# Patient Record
Sex: FEMALE | Employment: UNEMPLOYED | URBAN - METROPOLITAN AREA
[De-identification: names, ages, dates, MRNs, and addresses within clinical notes are randomized per-mention and may not be internally consistent; named-entity substitution may affect disease eponyms.]

---

## 2017-01-15 ENCOUNTER — HOSPITAL ENCOUNTER (EMERGENCY)
Age: 27
Discharge: HOME | End: 2017-01-15
Attending: EMERGENCY MEDICINE
Payer: COMMERCIAL

## 2017-01-15 VITALS
TEMPERATURE: 98 F | HEIGHT: 65 IN | RESPIRATION RATE: 16 BRPM | OXYGEN SATURATION: 99 % | HEART RATE: 58 BPM | WEIGHT: 140 LBS | SYSTOLIC BLOOD PRESSURE: 119 MMHG | DIASTOLIC BLOOD PRESSURE: 77 MMHG | BODY MASS INDEX: 23.32 KG/M2

## 2017-01-15 DIAGNOSIS — H10.31 ACUTE BACTERIAL CONJUNCTIVITIS OF RIGHT EYE: Primary | ICD-10-CM

## 2017-01-15 PROCEDURE — 99281 EMR DPT VST MAYX REQ PHY/QHP: CPT

## 2017-01-15 RX ORDER — OFLOXACIN 3 MG/ML
SOLUTION/ DROPS OPHTHALMIC
Qty: 5 ML | Refills: 0 | Status: SHIPPED | OUTPATIENT
Start: 2017-01-15

## 2017-01-15 RX ORDER — PROPARACAINE HYDROCHLORIDE 5 MG/ML
1 SOLUTION/ DROPS OPHTHALMIC
Status: DISCONTINUED | OUTPATIENT
Start: 2017-01-15 | End: 2017-01-15 | Stop reason: HOSPADM

## 2017-01-15 RX ORDER — IBUPROFEN 800 MG/1
800 TABLET ORAL
Qty: 20 TAB | Refills: 0 | Status: SHIPPED | OUTPATIENT
Start: 2017-01-15 | End: 2017-01-22

## 2017-01-15 NOTE — ED NOTES
Both written and verbal discharge instructions given to pt with verbalized understanding of home care and follow up. Prescriptions given.

## 2017-01-15 NOTE — ED TRIAGE NOTES
Triage: pt complains of pain to right eye after inserting contact lens today. Pt states that it feels like she scratched it.

## 2017-01-15 NOTE — ED PROVIDER NOTES
Patient is a 32 y.o. female presenting with eye pain. The history is provided by the patient. Eye Pain    This is a new problem. The current episode started 6 to 12 hours ago. The problem occurs constantly. The problem has not changed since onset. The right eye is affected. The injury mechanism was contact lenses (Old contact lenses). The pain is at a severity of 9/10. History of trauma: Only putting in contacts, worried about \"scratch to eye\" There is no known exposure to pink eye. She wears contacts. Associated symptoms include discharge, eye redness, itching and pain. Pertinent negatives include no numbness, no blurred vision, no decreased vision, no double vision, no foreign body sensation, no photophobia, no nausea, no vomiting, no tingling, no weakness, no fever, no blindness, no head injury and no dizziness. She has tried nothing for the symptoms. Past Medical History:   Diagnosis Date    Asthma      Age 7-8 then resolved    Sepsis(995.91) 4/2012     Post dental extraction-developed a splenic abscess       Past Surgical History:   Procedure Laterality Date    Hx splenectomy  4/2012         Family History:   Problem Relation Age of Onset    Diabetes Father        Social History     Social History    Marital status: SINGLE     Spouse name: N/A    Number of children: N/A    Years of education: N/A     Occupational History          Social History Main Topics    Smoking status: Current Some Day Smoker     Packs/day: 0.25     Years: 3.00     Types: Cigarettes    Smokeless tobacco: Never Used      Comment: 2 cigs/ day    Alcohol use 1.0 oz/week     2 Shots of liquor per week      Comment: occasionally.  Drug use: Yes     Special: Prescription, OTC    Sexual activity: Yes     Partners: Female     Birth control/ protection: None     Other Topics Concern    Not on file     Social History Narrative    Unemployed currently, going to school for GED currently.     Wants to go to school for business marketing. ALLERGIES: Review of patient's allergies indicates no known allergies. Review of Systems   Constitutional: Negative for chills and fever. HENT: Negative for ear pain, rhinorrhea and sore throat. Eyes: Positive for pain, discharge, redness and itching. Negative for blindness, blurred vision, double vision, photophobia and visual disturbance. Respiratory: Negative for cough and shortness of breath. Cardiovascular: Negative for chest pain. Gastrointestinal: Negative for abdominal pain, constipation, diarrhea, nausea and vomiting. Genitourinary: Negative for dysuria. Skin: Positive for itching. Neurological: Negative for dizziness, tingling, weakness, light-headedness, numbness and headaches. Psychiatric/Behavioral: Negative. Vitals:    01/15/17 1617   BP: 119/77   Pulse: (!) 58   Resp: 16   Temp: 98 °F (36.7 °C)   SpO2: 99%   Weight: 63.5 kg (140 lb)   Height: 5' 5\" (1.651 m)            Physical Exam   Constitutional: She is oriented to person, place, and time. She appears well-developed and well-nourished. HENT:   Head: Normocephalic and atraumatic. Right Ear: Tympanic membrane, external ear and ear canal normal.   Left Ear: Tympanic membrane, external ear and ear canal normal.   Nose: Nose normal.   Mouth/Throat: Oropharynx is clear and moist and mucous membranes are normal.   Eyes: EOM and lids are normal. Pupils are equal, round, and reactive to light. Lids are everted and swept, no foreign bodies found. Right eye exhibits no chemosis, no discharge, no exudate and no hordeolum. No foreign body present in the right eye. Left eye exhibits no chemosis, no discharge, no exudate and no hordeolum. No foreign body present in the left eye. Right conjunctiva is injected. Right conjunctiva has no hemorrhage. Left conjunctiva is not injected. Left conjunctiva has no hemorrhage. No scleral icterus.  Right eye exhibits normal extraocular motion and no nystagmus. Left eye exhibits normal extraocular motion and no nystagmus. Left eye:  EOMI. Non-icteric sclera. ERYTHEMATOUS conjunctiva. No foreign bodies noted. Noted visual acuity in triage. There are no signs of cellulitis nor periorbital cellulitis. Right eye: EOMI. Non-icteric sclera. ERYTHEMATOUS conjunctiva. No foreign bodies noted. No fluorescein uptake noted. Noted visual acuity in triage. There are no signs of cellulitis nor periorbital cellulitis. Neck: Normal range of motion. Cardiovascular: Normal rate, regular rhythm and normal heart sounds. Pulmonary/Chest: Effort normal and breath sounds normal.   Abdominal: Soft. Bowel sounds are normal. There is no tenderness. Musculoskeletal: Normal range of motion. Neurological: She is alert and oriented to person, place, and time. Skin: Skin is warm and dry. Psychiatric: She has a normal mood and affect. Her behavior is normal. Judgment and thought content normal.   Nursing note and vitals reviewed. MDM  Number of Diagnoses or Management Options  Diagnosis management comments: DDx: conjunctivitis, hordeolum/stye, blepharitis, corneal abrasion, subcoj hemorrhage, herpes keratitis, eye injury, FB in eye, glaucoma, macular degeneration, periorbital/orbital cellulitis, eye injury, HA, migraine, sinusitis, meningitis, SAH, head injury, TIA, stroke, change in visual acuity, viral illness, scalp/skin etiology, malignancy     IMPRESSION AND MEDICAL DECISION MAKING:  Based upon the patient's presentation with noted HPI and PE, along with the work up done in the emergency department, I believe that the patient is having noted conjunctivitis. Given contact wearer, will cover for pseudomonas. DIAGNOSIS:  1. Conjunctivitis, right eye    SPECIFIC PATIENT INSTRUCTIONS FROM THE PHYSICIAN WHO TREATED YOU IN THE ER TODAY:  1. Return if any concerns or worsening of condition(s)  2. Ofloxacin eye drops as prescribed.   3. Take the Motrin for pain as needed as prescribed. 4. Do not wear contacts until finished with antibiotic drops. 5. FOLLOW UP APPOINTMENT:  Your ophthalmologist or the one listed in these discharge instructions in the next week. Pt results have been reviewed with them. They have been counseled regarding diagnosis, treatment, and plan. Pt verbally conveys understanding and agreement of the signs, symptoms, diagnosis, treatment and prognosis and additionally agrees to follow up as discussed. Pt also agrees with the care-plan and conveys that all of their questions have been answered. I have also provided discharge instructions for them that include: educational information regarding their diagnosis and treatment, and list of reasons why they would want to return to the ED prior to their follow-up appointment, should their condition change. Emily Moreland PA-C 5:31 PM        Amount and/or Complexity of Data Reviewed  Tests in the medicine section of CPT®: ordered and reviewed  Discussion of test results with the performing providers: yes  Decide to obtain previous medical records or to obtain history from someone other than the patient: yes  Obtain history from someone other than the patient: yes  Review and summarize past medical records: yes  Discuss the patient with other providers: yes  Independent visualization of images, tracings, or specimens: yes    Risk of Complications, Morbidity, and/or Mortality  Presenting problems: low  Diagnostic procedures: low  Management options: low    Patient Progress  Patient progress: stable    ED Course       Procedures    Diagnosis:   1.  Acute bacterial conjunctivitis of right eye          Disposition: home    Follow-up Information     Follow up With Details Comments Contact Info    [facility] EMERGENCY DEPT  As needed, If symptoms worsen [address] [facility] In 1 week  [address],[facility] Patient's Medications   Start Taking    IBUPROFEN (MOTRIN) 800 MG TABLET    Take 1 Tab by mouth every six (6) hours as needed for Pain for up to 7 days. OFLOXACIN (FLOXIN) 0.3 % OPHTHALMIC SOLUTION    1 to 2 drops in right eye four times a day for seven days.    Continue Taking    No medications on file   These Medications have changed    No medications on file   Stop Taking    No medications on file